# Patient Record
Sex: FEMALE | Race: WHITE | NOT HISPANIC OR LATINO | Employment: OTHER | URBAN - METROPOLITAN AREA
[De-identification: names, ages, dates, MRNs, and addresses within clinical notes are randomized per-mention and may not be internally consistent; named-entity substitution may affect disease eponyms.]

---

## 2017-01-23 ENCOUNTER — OFFICE VISIT (OUTPATIENT)
Dept: GASTROENTEROLOGY | Facility: CLINIC | Age: 77
End: 2017-01-23
Payer: MEDICARE

## 2017-01-23 ENCOUNTER — TELEPHONE (OUTPATIENT)
Dept: ENDOSCOPY | Facility: HOSPITAL | Age: 77
End: 2017-01-23

## 2017-01-23 VITALS
HEART RATE: 70 BPM | BODY MASS INDEX: 23.39 KG/M2 | SYSTOLIC BLOOD PRESSURE: 108 MMHG | WEIGHT: 163.38 LBS | HEIGHT: 70 IN | DIASTOLIC BLOOD PRESSURE: 62 MMHG

## 2017-01-23 DIAGNOSIS — Z12.11 SPECIAL SCREENING FOR MALIGNANT NEOPLASMS, COLON: Primary | ICD-10-CM

## 2017-01-23 DIAGNOSIS — K51.90 CHRONIC ULCERATIVE COLITIS, WITHOUT COMPLICATIONS: Primary | ICD-10-CM

## 2017-01-23 PROCEDURE — 99999 PR PBB SHADOW E&M-EST. PATIENT-LVL III: CPT | Mod: PBBFAC,GC,, | Performed by: INTERNAL MEDICINE

## 2017-01-23 PROCEDURE — 99213 OFFICE O/P EST LOW 20 MIN: CPT | Mod: PBBFAC | Performed by: INTERNAL MEDICINE

## 2017-01-23 PROCEDURE — 99214 OFFICE O/P EST MOD 30 MIN: CPT | Mod: S$PBB,GC,, | Performed by: INTERNAL MEDICINE

## 2017-01-23 RX ORDER — MESALAMINE 4 G/60ML
4 SUSPENSION RECTAL NIGHTLY
Qty: 1800 ML | Refills: 11 | Status: SHIPPED | OUTPATIENT
Start: 2017-01-23 | End: 2017-07-24

## 2017-01-23 RX ORDER — SODIUM, POTASSIUM,MAG SULFATES 17.5-3.13G
1 SOLUTION, RECONSTITUTED, ORAL ORAL AS DIRECTED
Qty: 1 BOTTLE | Refills: 0 | Status: SHIPPED | OUTPATIENT
Start: 2017-01-23 | End: 2017-07-24

## 2017-01-23 NOTE — PROGRESS NOTES
Gastroenterology Clinic    Reason for visit: The encounter diagnosis was Chronic ulcerative colitis, without complications.  Referring provider/PCP: LUTHER Perez    HPI:  Lena Felipe is a 76 y.o. lady with PMHx of HTN and UC (pan colitis, diagnosed in late 2013) who presents for follow-up. She was diagnosed with a colonoscopy in Jan 2014 when she was having bloody diarrhea. She has been maintained in remission with Balsalazide for the past two years. She has never been on any biologic therapy.      She is here with her  today. She reports she has been noticing blood in her stools for the past 6 months. She reports 2-3 formed stools daily with blood mixed with them. She was started on Colocort enemas in Jun and her bleeding stopped after a month of use. Her symptoms recurred in Sep 2016 and now bleeding continues despite 3 month of nightly colocot enema use. She has no night time symptoms, urgency or incomplete evacuation. She reports mild LLQ pain. She denies any extra-intestinal manifestations.     She does not smoke or drink.    PEndoHx:  Colon in Jan 2014 showed pan-colitis  Colon in Jan 2016 showed no colitis with no active disease on path.    Review of Systems:  Constitutional: no fever, chills or change in weight   Eyes: no visual changes   ENT: no sore throat or dysphagia  Respiratory: no cough or shortness of breath   Cardiovascular: no chest pain or palpitations   Gastrointestinal: as per HPI  Hematologic/Lymphatic: no easy bruising or lymphadenopathy   Musculoskeletal: no arthralgias or myalgias   Neurological: no change in mental status  Behavioral/Psych: no change in mood      Past Medical History   Diagnosis Date    Hypertension     Osteoporosis     Ulcerative colitis        Past Surgical History   Procedure Laterality Date    Hysterectomy      Tonsillectomy, adenoidectomy      Appendectomy      Toe surgery       left 2nd to last toe    Colonoscopy N/A 1/12/2016      Procedure: COLONOSCOPY;  Surgeon: Hong Rocha MD;  Location: [address]);  Service: Endoscopy;  Laterality: N/A;       Family History   Problem Relation Age of Onset    Celiac disease Neg Hx     Cirrhosis Neg Hx     Colon cancer Neg Hx     Colon polyps Neg Hx     Crohn's disease Neg Hx     Cystic fibrosis Neg Hx     Esophageal cancer Neg Hx     Hemochromatosis Neg Hx     Inflammatory bowel disease Neg Hx     Irritable bowel syndrome Neg Hx     Liver cancer Neg Hx     Liver disease Neg Hx     Rectal cancer Neg Hx     Stomach cancer Neg Hx     Ulcerative colitis Neg Hx     Ayaan's disease Neg Hx        Social History     Social History    Marital status:      Spouse name: N/A    Number of children: N/A    Years of education: N/A     Occupational History    Not on file.     Social History Main Topics    Smoking status: Never Smoker    Smokeless tobacco: Never Used    Alcohol use No    Drug use: Not on file    Sexual activity: Not on file     Other Topics Concern    Not on file     Social History Narrative       Current Outpatient Prescriptions on File Prior to Visit   Medication Sig Dispense Refill    alendronate (FOSAMAX) 70 MG tablet Take 70 mg by mouth every 7 days.      amlodipine-benazepril 5-20 mg (LOTREL) 5-20 mg per capsule Take 1 capsule by mouth once daily.      atenolol (TENORMIN) 50 MG tablet Take 50 mg by mouth once daily.      balsalazide (COLAZAL) 750 mg capsule Take 3 capsules (2,250 mg total) by mouth 3 (three) times daily. 720 capsule 3    calcium-vitamin D (OSCAL) 250 (625)-125 mg-unit per tablet Take 2 tablets by mouth once daily.      hydrochlorothiazide (MICROZIDE) 12.5 mg capsule Take 12.5 mg by mouth once daily.      MULTIVIT-IRON-MIN-FOLIC ACID 3,500-18-0.4 UNIT-MG-MG ORAL CHEW Take 1 tablet by mouth once daily.      pravastatin (PRAVACHOL) 10 MG tablet Take 10 mg by mouth once daily.      ZINC ORAL Take by mouth once daily.       "[DISCONTINUED] hydrocortisone-pramoxine (PROCTOFOAM-HS) rectal foam Place 1 applicator rectally 2 (two) times daily. 10 g 3    [DISCONTINUED] mesalamine (CANASA) 1000 MG Supp Place 1 suppository (1,000 mg total) rectally nightly. 30 suppository 11    [DISCONTINUED] mesalamine (CANASA) 1000 MG Supp Place 1 suppository (1,000 mg total) rectally nightly. 30 suppository 11     No current facility-administered medications on file prior to visit.        Review of patient's allergies indicates:  No Known Allergies    Physical Exam:  Visit Vitals    /62    Pulse 70    Ht 5' 10" (1.778 m)    Wt 74.1 kg (163 lb 5.8 oz)    BMI 23.44 kg/m2     General appearance: alert, appears stated age and cooperative  Neck: no JVD and supple, symmetrical, trachea midline  Lungs: clear to auscultation bilaterally  Chest wall: no tenderness  Heart: regular rate and rhythm, S1, S2 normal, no murmur, click, rub or gallop  Abdomen: soft, mild tenderness to deep palpation in LLQ; bowel sounds normal; no masses,  no organomegaly  Rectal: No large external hemorrhoids  Extremities: extremities normal, atraumatic, no cyanosis or edema  Pulses: 2+ and symmetric  Neurologic: Grossly normal    Laboratory:  Lab Results   Component Value Date    WBC 5.90 04/25/2016    HGB 12.9 04/25/2016    HCT 38.7 04/25/2016    MCV 87 04/25/2016     04/25/2016     CMP  Sodium   Date Value Ref Range Status   04/25/2016 141 136 - 145 mmol/L Final     Potassium   Date Value Ref Range Status   04/25/2016 3.8 3.5 - 5.1 mmol/L Final     Chloride   Date Value Ref Range Status   04/25/2016 104 95 - 110 mmol/L Final     CO2   Date Value Ref Range Status   04/25/2016 28 23 - 29 mmol/L Final     Glucose   Date Value Ref Range Status   04/25/2016 91 70 - 110 mg/dL Final     BUN, Bld   Date Value Ref Range Status   04/25/2016 16 8 - 23 mg/dL Final     Creatinine   Date Value Ref Range Status   04/25/2016 0.8 0.5 - 1.4 mg/dL Final     Calcium   Date Value Ref " Range Status   04/25/2016 9.7 8.7 - 10.5 mg/dL Final     Total Protein   Date Value Ref Range Status   04/25/2016 6.9 6.0 - 8.4 g/dL Final     Albumin   Date Value Ref Range Status   04/25/2016 3.8 3.5 - 5.2 g/dL Final     Total Bilirubin   Date Value Ref Range Status   04/25/2016 0.7 0.1 - 1.0 mg/dL Final     Comment:     For infants and newborns, interpretation of results should be based  on gestational age, weight and in agreement with clinical  observations.  Premature Infant recommended reference ranges:  Up to 24 hours.............<8.0 mg/dL  Up to 48 hours............<12.0 mg/dL  3-5 days..................<15.0 mg/dL  6-29 days.................<15.0 mg/dL       Alkaline Phosphatase   Date Value Ref Range Status   04/25/2016 76 55 - 135 U/L Final     AST   Date Value Ref Range Status   04/25/2016 30 10 - 40 U/L Final     ALT   Date Value Ref Range Status   04/25/2016 15 10 - 44 U/L Final     Anion Gap   Date Value Ref Range Status   04/25/2016 9 8 - 16 mmol/L Final     eGFR if    Date Value Ref Range Status   04/25/2016 >60.0 >60 mL/min/1.73 m^2 Final     eGFR if non    Date Value Ref Range Status   04/25/2016 >60.0 >60 mL/min/1.73 m^2 Final     Comment:     Calculation used to obtain the estimated glomerular filtration  rate (eGFR) is the CKD-EPI equation. Since race is unknown   in our information system, the eGFR values for   -American and Non--American patients are given   for each creatinine result.         Imaging:  Reviewed    Assessment:  Lena Felipe is a 76 y.o. lady with PMHx of HTN and UC (pan colitis, diagnosed in late 2013) who presents for follow-up. She appears to have clinical recurrence of her disease as manifested by blood per rectum and LLQ pain. Hydrocortisone enema's have failed to stop the bleeding. This is concerning for worsening disease activity.    Plan:  1. Stop hydrocortisone enemas and switch to Rowasa enema QHS  2. Will schedule  colonoscopy if Rowasa fails to resolve symptoms in 1 month  3. Continue Balsalazide  4. Check CBC, CMP and CRP today    Estrada Wilcox  Gastroenterology Fellow (PGY VI)  Pager: [phone]    Orders Placed This Encounter   Procedures    CBC auto differential    C-reactive protein    Comprehensive metabolic panel

## 2017-01-23 NOTE — MR AVS SNAPSHOT
[facility]  [address]  [place] [address]  Phone: [phone]  Fax: [phone]                  Lena Felipe   2017 2:00 PM   Office Visit    Description:  Female : 1940   Provider:  Estrada Wilcox MD   Department:  [facility]           Reason for Visit     Ulcerative Colitis           Diagnoses this Visit        Comments    Chronic ulcerative colitis, without complications    -  Primary            To Do List           Future Appointments        Provider Department Dept Phone    2017 3:00 PM LAB, APPOINTMENT [place] [facility] [phone]      Goals (5 Years of Data)     None       These Medications        Disp Refills Start End    mesalamine (ROWASA) 4 gram/60 mL Enem 1800 mL 11 2017    Place 60 mLs (4 g total) rectally every evening. - Rectal    Pharmacy: [address] #: [phone]         [facility] On Call     [facility] On [facility] Nurse Care Line -  Assistance  Registered nurses in the [facility] Center provide clinical advisement, health education, appointment booking, and other advisory services.  Call for this free service at [phone].             Medications           Message regarding Medications     Verify the changes and/or additions to your medication regime listed below are the same as discussed with your clinician today.  If any of these changes or additions are incorrect, please notify your healthcare provider.        START taking these NEW medications        Refills    mesalamine (ROWASA) 4 gram/60 mL Enem 11    Sig: Place 60 mLs (4 g total) rectally every evening.    Class: Normal    Route: Rectal      STOP taking these medications     hydrocortisone-pramoxine (PROCTOFOAM-HS) rectal foam Place 1 applicator rectally 2 (two) times daily.    mesalamine (CANASA) 1000 MG Supp Place 1 suppository (1,000 mg total) rectally nightly.  "   mesalamine (CANASA) 1000 MG Supp Place 1 suppository (1,000 mg total) rectally nightly.           Verify that the below list of medications is an accurate representation of the medications you are currently taking.  If none reported, the list may be blank. If incorrect, please contact your healthcare provider. Carry this list with you in case of emergency.           Current Medications     alendronate (FOSAMAX) 70 MG tablet Take 70 mg by mouth every 7 days.    amlodipine-benazepril 5-20 mg (LOTREL) 5-20 mg per capsule Take 1 capsule by mouth once daily.    atenolol (TENORMIN) 50 MG tablet Take 50 mg by mouth once daily.    balsalazide (COLAZAL) 750 mg capsule Take 3 capsules (2,250 mg total) by mouth 3 (three) times daily.    calcium-vitamin D (OSCAL) 250 (625)-125 mg-unit per tablet Take 2 tablets by mouth once daily.    hydrochlorothiazide (MICROZIDE) 12.5 mg capsule Take 12.5 mg by mouth once daily.    HYDROCORTISONE (COLOCORT RECT) Place rectally.    MULTIVIT-IRON-MIN-FOLIC ACID 3,500-18-0.4 UNIT-MG-MG ORAL CHEW Take 1 tablet by mouth once daily.    pravastatin (PRAVACHOL) 10 MG tablet Take 10 mg by mouth once daily.    mesalamine (ROWASA) 4 gram/60 mL Enem Place 60 mLs (4 g total) rectally every evening.    ZINC ORAL Take by mouth once daily.           Clinical Reference Information           Vital Signs - Last Recorded  Most recent update: 1/23/2017  1:35 PM by Justina Wynne MA    BP Pulse Ht Wt BMI    108/62 70 5' 10" (1.778 m) 74.1 kg (163 lb 5.8 oz) 23.44 kg/m2      Blood Pressure          Most Recent Value    BP  108/62      Allergies as of 1/23/2017     No Known Allergies      Immunizations Administered on Date of Encounter - 1/23/2017     None      Orders Placed During Today's Visit      Normal Orders This Visit    Case request GI: COLONOSCOPY     Future Labs/Procedures Expected by Expires    C-reactive protein  1/23/2017 3/24/2018    CBC auto differential  1/23/2017 3/24/2018    Comprehensive " metabolic panel  1/23/2017 3/24/2018

## 2017-02-10 ENCOUNTER — TELEPHONE (OUTPATIENT)
Dept: GASTROENTEROLOGY | Facility: CLINIC | Age: 77
End: 2017-02-10

## 2017-02-10 NOTE — TELEPHONE ENCOUNTER
"----- Message from Hong Rocha MD sent at 2/10/2017 12:22 PM CST -----  Contact: self  [phone]  She could cut back to every other night for two weeks  ----- Message -----     From: Justina Wynne MA     Sent: 2/9/2017   4:21 PM       To: Hong Rocha MD    Doing much better. Medication, Rowasa enemas, are working.  Not seeing any blood. Not quite secure "in that area" but better.She has 2 more weeks of the enemas.  She would like to know if she continues every night or starts cutting back?  ----- Message -----     From: Divina Barth MA     Sent: 2/9/2017   4:04 PM       To: Aj Steinberg    States she is calling to report to you how she is doing on the new medication and to find out what she needs to do next.      "

## 2017-03-16 ENCOUNTER — TELEPHONE (OUTPATIENT)
Dept: GASTROENTEROLOGY | Facility: CLINIC | Age: 77
End: 2017-03-16

## 2017-03-16 ENCOUNTER — TELEPHONE (OUTPATIENT)
Dept: ENDOSCOPY | Facility: HOSPITAL | Age: 77
End: 2017-03-16

## 2017-03-16 NOTE — TELEPHONE ENCOUNTER
----- Message from Hong Rocha MD sent at 3/16/2017  8:53 AM CDT -----  Contact: Self- [phone]  That sounds like a good response  Let's stop the enemas now  ----- Message -----     From: Justina Wynne MA     Sent: 3/16/2017   8:46 AM       To: Hong Rocha MD     Has been using the mesalamine enemas every other nite since January.  Does not see any visible blood. Her bathroom habits are normal or as normal as they can be for her.  She kyle like to know how much longer do you want her on the enemas?  ----- Message -----     From: Alcira Berkowitz     Sent: 3/16/2017   8:23 AM       To: Aj Rocha- pt is returning a call to Sosa to let her know how her medication is working- mesalamine (ROWASA) 4 gram/60 mL Enem- says there is no bleeding- please call pt back at [phone]

## 2017-03-16 NOTE — TELEPHONE ENCOUNTER
Returned call. Patient asked to cancel order for an appointment for Colonoscopy scheduled on 4/4/2017 at 10 am. She states her condition is improved and was told by Dr. Wilcox/Dr. Rocha no need to have the procedure. Order canceled. Instructed the patient to call Dr. Rocha if her condition changes.

## 2017-07-24 ENCOUNTER — OFFICE VISIT (OUTPATIENT)
Dept: GASTROENTEROLOGY | Facility: CLINIC | Age: 77
End: 2017-07-24
Payer: MEDICARE

## 2017-07-24 VITALS
HEIGHT: 70 IN | DIASTOLIC BLOOD PRESSURE: 75 MMHG | HEART RATE: 63 BPM | SYSTOLIC BLOOD PRESSURE: 147 MMHG | BODY MASS INDEX: 23.04 KG/M2 | WEIGHT: 160.94 LBS

## 2017-07-24 DIAGNOSIS — K21.9 GASTROESOPHAGEAL REFLUX DISEASE WITHOUT ESOPHAGITIS: ICD-10-CM

## 2017-07-24 DIAGNOSIS — K51.90 ULCERATIVE COLITIS, CHRONIC, WITHOUT COMPLICATIONS: Primary | ICD-10-CM

## 2017-07-24 PROCEDURE — 1159F MED LIST DOCD IN RCRD: CPT | Mod: ,,, | Performed by: INTERNAL MEDICINE

## 2017-07-24 PROCEDURE — 99999 PR PBB SHADOW E&M-EST. PATIENT-LVL III: CPT | Mod: PBBFAC,,, | Performed by: INTERNAL MEDICINE

## 2017-07-24 PROCEDURE — 1126F AMNT PAIN NOTED NONE PRSNT: CPT | Mod: ,,, | Performed by: INTERNAL MEDICINE

## 2017-07-24 PROCEDURE — 99213 OFFICE O/P EST LOW 20 MIN: CPT | Mod: PBBFAC | Performed by: INTERNAL MEDICINE

## 2017-07-24 PROCEDURE — 99214 OFFICE O/P EST MOD 30 MIN: CPT | Mod: S$PBB,,, | Performed by: INTERNAL MEDICINE

## 2017-07-24 RX ORDER — GABAPENTIN 300 MG/1
300 CAPSULE ORAL DAILY
COMMUNITY

## 2017-07-24 NOTE — PROGRESS NOTES
Subjective:       Patient ID: Lena Felipe is a 77 y.o. female.    Chief Complaint: Ulcerative Colitis    HPI  Review of Systems   Constitutional: Positive for fatigue. Negative for activity change, appetite change, chills, diaphoresis, fever and unexpected weight change.   HENT: Negative for congestion, ear pain, mouth sores, nosebleeds, postnasal drip, rhinorrhea, sinus pressure, sore throat, trouble swallowing and voice change.    Eyes: Negative for pain.   Respiratory: Negative for cough, shortness of breath and wheezing.    Cardiovascular: Negative for chest pain, palpitations and leg swelling.   Genitourinary: Negative for difficulty urinating, dysuria, flank pain, hematuria and menstrual problem.   Musculoskeletal: Positive for arthralgias. Negative for back pain, gait problem, joint swelling, myalgias and neck pain.   Skin: Negative for rash.   Neurological: Positive for headaches. Negative for dizziness, tremors, syncope and numbness.   Hematological: Negative for adenopathy. Does not bruise/bleed easily.   Psychiatric/Behavioral: Negative for agitation, behavioral problems, confusion, decreased concentration and dysphoric mood. The patient is not nervous/anxious.        Objective:      Physical Exam   Abdominal: Soft. Normal appearance and bowel sounds are normal. She exhibits no distension, no fluid wave and no mass. There is no hepatosplenomegaly. There is no tenderness.       Assessment:       1. Ulcerative colitis, chronic, without complications    2. Gastroesophageal reflux disease without esophagitis        Plan:         This is a followup visit for Ms. Felipe.  She is here with her , they   drove down from [place] today.  She is a lady who has had ulcerative   colitis, which is pancolitis since 2013.  Last colonoscopy 2016 showed the   disease to be in remission on balsalazide.  Although, earlier this year   symptomatically, she was having a flare of disease.  We tried treating that  with   hydrocortisone enemas and that did not seem to help very much, but a change   over to Rowasa enemas promptly brought her symptoms under control.    I reviewed her current symptoms.  She is doing very well right now.  Her stools   are formed.  She typically has 2-3 bowel movements per day.  There is no   diarrhea.  No blood.  No tenesmus.  She has some mild urgency in the morning.    There is no abdominal pain or abdominal cramps.  There is no nausea or vomiting.    No perianal symptoms.  Her reflux symptoms are better, they are well   controlled on Zantac alone.    REVIEW OF SYSTEMS:  Significant only for a recent episode of shingles for which   she was put on gabapentin.    ASSESSMENT AND DECISION MAKIN. Ulcerative colitis.  This is pancolitis of four years duration.  I talked   about potential complications.  She has had a flare that we brought under   control with Rowasa enemas, any increase in symptoms we will try that again.  I   recommended a colonoscopy every two years for colon cancer surveillance because   of her onset at an advanced age.  Recommend colonoscopy in January.  We   discussed compliance with treatment and the importance of staying on   balsalazide.  She asked if she could ever get off it and there may come a time   if she has an established remission for a long time we will discuss stopping it,   but I think now it would be appropriate to continue the medicine.  2. Reflux.  She is well controlled on Zantac.  No reason to change that.    Encouraged good habits.      DEA  dd: 2017 13:54:02 (CDT)  td: 2017 04:15:43 (CDT)  Doc ID   #[id number]  Job ID #[id number]    CC:

## 2017-08-31 DIAGNOSIS — K51.90 ULCERATIVE COLITIS, CHRONIC, WITHOUT COMPLICATIONS: ICD-10-CM

## 2017-08-31 RX ORDER — BALSALAZIDE DISODIUM 750 MG/1
CAPSULE ORAL
Qty: 720 CAPSULE | Refills: 3 | Status: SHIPPED | OUTPATIENT
Start: 2017-08-31 | End: 2017-08-31 | Stop reason: SDUPTHER

## 2017-08-31 RX ORDER — BALSALAZIDE DISODIUM 750 MG/1
2250 CAPSULE ORAL 3 TIMES DAILY
Qty: 720 CAPSULE | Refills: 11 | Status: SHIPPED | OUTPATIENT
Start: 2017-08-31 | End: 2019-01-21 | Stop reason: SDUPTHER

## 2017-10-17 ENCOUNTER — TELEPHONE (OUTPATIENT)
Dept: GASTROENTEROLOGY | Facility: CLINIC | Age: 77
End: 2017-10-17

## 2017-10-17 DIAGNOSIS — K51.019 CHRONIC ULCERATIVE PANCOLITIS WITH COMPLICATION: Primary | ICD-10-CM

## 2017-10-17 NOTE — TELEPHONE ENCOUNTER
Stated about a week ago she started noticing a little bit of blood, mainly in the afternoon. Last night she had a feeling she had to go to the bathroom but it was gas with a little blood.  Prior to this happening she stated she had a feeling of rawness.  She does have mesalamine enemas.  She wanted to know do you want her to start those?  She has a full Rx.

## 2017-10-17 NOTE — TELEPHONE ENCOUNTER
----- Message from Zahra Farrell sent at 10/17/2017  9:32 AM CDT -----  Contact: Self  Pt is calling to speak with Ms. Morales regarding her medical condition that has reoccurred.  Pt says she needs instructions.    She can be reached at [phone].    Thank you.

## 2017-10-20 ENCOUNTER — TELEPHONE (OUTPATIENT)
Dept: GASTROENTEROLOGY | Facility: CLINIC | Age: 77
End: 2017-10-20

## 2017-10-20 NOTE — TELEPHONE ENCOUNTER
----- Message from Daphnei Guerra sent at 10/20/2017 10:02 AM CDT -----  Contact: self - [phone]  [facility] is calling for instructions - please call patient at

## 2017-10-20 NOTE — TELEPHONE ENCOUNTER
Spoke to pt. Pt still having some bleeding. She will start her mesalamine enemas and wait for your call on Monday.

## 2017-10-24 ENCOUNTER — TELEPHONE (OUTPATIENT)
Dept: GASTROENTEROLOGY | Facility: CLINIC | Age: 77
End: 2017-10-24

## 2017-10-24 NOTE — TELEPHONE ENCOUNTER
----- Message from Daphnie Guerra sent at 10/20/2017 10:02 AM CDT -----  Contact: self - [phone]  [facility] is calling for instructions - please call patient at

## 2017-10-24 NOTE — TELEPHONE ENCOUNTER
Spoke with patient.  Aware Dr.James Rocha would like for her to take the enemas for 2 weeks and get back with him as to how she is feeling.  Patient expressed understanding.

## 2017-11-03 ENCOUNTER — TELEPHONE (OUTPATIENT)
Dept: GASTROENTEROLOGY | Facility: CLINIC | Age: 77
End: 2017-11-03

## 2017-11-03 NOTE — TELEPHONE ENCOUNTER
----- Message from Daphnie Guerra sent at 11/3/2017  9:54 AM CDT -----  Contact: self - [phone]  Aj - ulcerative proctitis - wants to discuss her condition - please call patient at

## 2017-11-06 DIAGNOSIS — K64.9 HEMORRHOIDS, UNSPECIFIED HEMORRHOID TYPE: Primary | ICD-10-CM

## 2017-11-06 RX ORDER — LIDOCAINE HYDROCHLORIDE AND HYDROCORTISONE ACETATE 30; 5 MG/G; MG/G
1 CREAM RECTAL 2 TIMES DAILY
Qty: 7 G | Refills: 1 | Status: SHIPPED | OUTPATIENT
Start: 2017-11-06 | End: 2021-11-04

## 2017-11-07 ENCOUNTER — TELEPHONE (OUTPATIENT)
Dept: GASTROENTEROLOGY | Facility: CLINIC | Age: 77
End: 2017-11-07

## 2017-11-07 NOTE — TELEPHONE ENCOUNTER
----- Message from Ramonita Hemphill MA sent at 11/7/2017  3:31 PM CST -----  Contact: [phone]  Aj  Returning a call to Sosa, checking on her condition and medicationl request     [phone]

## 2017-11-07 NOTE — TELEPHONE ENCOUNTER
Spoke with patient regarding a Rx sent to her pharmacy.  Patient stated she went to pick it up and was told a PA was needed.

## 2017-11-08 ENCOUNTER — TELEPHONE (OUTPATIENT)
Dept: GASTROENTEROLOGY | Facility: CLINIC | Age: 77
End: 2017-11-08

## 2017-11-08 NOTE — TELEPHONE ENCOUNTER
Patient aware her insurance company, [employer], denied the Lidocaine HCL-hydrocortisone  AC cream 3-0.5% due to the fact medicare states the FDA has not approved it to be safe and effective.  Aware our office spoke with the pharmacist at [facility] and recommended an equivalent that is otc called Nepercainal-cream(contains dibucaine), apply as directed on the packaging.   feels this will be as effective.  Patient expressed understanding.

## 2017-11-18 ENCOUNTER — TELEPHONE (OUTPATIENT)
Dept: ENDOSCOPY | Facility: HOSPITAL | Age: 77
End: 2017-11-18

## 2017-11-18 DIAGNOSIS — Z12.11 SPECIAL SCREENING FOR MALIGNANT NEOPLASMS, COLON: Primary | ICD-10-CM

## 2017-11-18 RX ORDER — SODIUM, POTASSIUM,MAG SULFATES 17.5-3.13G
1 SOLUTION, RECONSTITUTED, ORAL ORAL ONCE
Qty: 1 BOTTLE | Refills: 0 | Status: SHIPPED | OUTPATIENT
Start: 2017-11-18 | End: 2017-11-18

## 2018-01-12 ENCOUNTER — TELEPHONE (OUTPATIENT)
Dept: GASTROENTEROLOGY | Facility: CLINIC | Age: 78
End: 2018-01-12

## 2018-01-12 NOTE — TELEPHONE ENCOUNTER
----- Message from Divina Barth MA sent at 1/12/2018  8:56 AM CST -----  Contact: self  [phone]  States she is calling regarding the appointment on Tuesday, 1-16-18.  States she is calling to find out if she can post-pond the appointment due to her son who had brain surgery.

## 2018-02-01 ENCOUNTER — ANESTHESIA (OUTPATIENT)
Dept: ENDOSCOPY | Facility: HOSPITAL | Age: 78
End: 2018-02-01
Payer: MEDICARE

## 2018-02-01 ENCOUNTER — SURGERY (OUTPATIENT)
Age: 78
End: 2018-02-01

## 2018-02-01 ENCOUNTER — HOSPITAL ENCOUNTER (OUTPATIENT)
Facility: HOSPITAL | Age: 78
Discharge: HOME | End: 2018-02-01
Attending: INTERNAL MEDICINE | Admitting: INTERNAL MEDICINE
Payer: MEDICARE

## 2018-02-01 ENCOUNTER — ANESTHESIA EVENT (OUTPATIENT)
Dept: ENDOSCOPY | Facility: HOSPITAL | Age: 78
End: 2018-02-01
Payer: MEDICARE

## 2018-02-01 VITALS
RESPIRATION RATE: 18 BRPM | HEIGHT: 70 IN | WEIGHT: 160 LBS | SYSTOLIC BLOOD PRESSURE: 140 MMHG | TEMPERATURE: 98 F | BODY MASS INDEX: 22.9 KG/M2 | OXYGEN SATURATION: 98 % | DIASTOLIC BLOOD PRESSURE: 65 MMHG | HEART RATE: 78 BPM

## 2018-02-01 DIAGNOSIS — K51.00 ULCERATIVE CHRONIC PANCOLITIS WITHOUT COMPLICATIONS: Primary | ICD-10-CM

## 2018-02-01 PROCEDURE — 63600175 PHARM REV CODE 636 W HCPCS: Performed by: NURSE ANESTHETIST, CERTIFIED REGISTERED

## 2018-02-01 PROCEDURE — 27201012 HC FORCEPS, HOT/COLD, DISP: Performed by: INTERNAL MEDICINE

## 2018-02-01 PROCEDURE — 45380 COLONOSCOPY AND BIOPSY: CPT | Performed by: INTERNAL MEDICINE

## 2018-02-01 PROCEDURE — 45380 COLONOSCOPY AND BIOPSY: CPT | Mod: PT,,, | Performed by: INTERNAL MEDICINE

## 2018-02-01 PROCEDURE — 37000008 HC ANESTHESIA 1ST 15 MINUTES: Performed by: INTERNAL MEDICINE

## 2018-02-01 PROCEDURE — 37000009 HC ANESTHESIA EA ADD 15 MINS: Performed by: INTERNAL MEDICINE

## 2018-02-01 PROCEDURE — D9220A PRA ANESTHESIA: Mod: PT,ANES,, | Performed by: ANESTHESIOLOGY

## 2018-02-01 PROCEDURE — 88342 IMHCHEM/IMCYTCHM 1ST ANTB: CPT | Mod: 26,,, | Performed by: PATHOLOGY

## 2018-02-01 PROCEDURE — 88305 TISSUE EXAM BY PATHOLOGIST: CPT | Mod: 26,,, | Performed by: PATHOLOGY

## 2018-02-01 PROCEDURE — 88342 IMHCHEM/IMCYTCHM 1ST ANTB: CPT | Performed by: PATHOLOGY

## 2018-02-01 PROCEDURE — 88305 TISSUE EXAM BY PATHOLOGIST: CPT | Performed by: PATHOLOGY

## 2018-02-01 PROCEDURE — 25000003 PHARM REV CODE 250: Performed by: INTERNAL MEDICINE

## 2018-02-01 PROCEDURE — D9220A PRA ANESTHESIA: Mod: PT,CRNA,, | Performed by: NURSE ANESTHETIST, CERTIFIED REGISTERED

## 2018-02-01 RX ORDER — PROPOFOL 10 MG/ML
VIAL (ML) INTRAVENOUS
Status: DISCONTINUED | OUTPATIENT
Start: 2018-02-01 | End: 2018-02-01

## 2018-02-01 RX ORDER — PROPOFOL 10 MG/ML
VIAL (ML) INTRAVENOUS CONTINUOUS PRN
Status: DISCONTINUED | OUTPATIENT
Start: 2018-02-01 | End: 2018-02-01

## 2018-02-01 RX ORDER — LIDOCAINE HCL/PF 100 MG/5ML
SYRINGE (ML) INTRAVENOUS
Status: DISCONTINUED | OUTPATIENT
Start: 2018-02-01 | End: 2018-02-01

## 2018-02-01 RX ORDER — SODIUM CHLORIDE 9 MG/ML
INJECTION, SOLUTION INTRAVENOUS CONTINUOUS
Status: DISCONTINUED | OUTPATIENT
Start: 2018-02-01 | End: 2018-02-01 | Stop reason: HOSPADM

## 2018-02-01 RX ADMIN — PROPOFOL 40 MG: 10 INJECTION, EMULSION INTRAVENOUS at 11:02

## 2018-02-01 RX ADMIN — LIDOCAINE HYDROCHLORIDE 100 MG: 20 INJECTION, SOLUTION INTRAVENOUS at 11:02

## 2018-02-01 RX ADMIN — SODIUM CHLORIDE: 0.9 INJECTION, SOLUTION INTRAVENOUS at 10:02

## 2018-02-01 RX ADMIN — PROPOFOL 150 MCG/KG/MIN: 10 INJECTION, EMULSION INTRAVENOUS at 11:02

## 2018-02-01 NOTE — TRANSFER OF CARE
"Anesthesia Transfer of Care Note    Patient: Lena Felipe    Procedure(s) Performed: Procedure(s) (LRB):  COLONOSCOPY (N/A)    Patient location: PACU    Anesthesia Type: general    Transport from OR: Transported from OR on room air with adequate spontaneous ventilation    Post pain: adequate analgesia    Post assessment: no apparent anesthetic complications    Post vital signs: stable    Level of consciousness: awake    Nausea/Vomiting: no nausea/vomiting    Complications: none    Transfer of care protocol was followed      Last vitals:   Visit Vitals  BP (!) 122/57 (Patient Position: Lying)   Pulse 66   Temp 36.7 °C (98 °F) (Temporal)   Resp 16   Ht 5' 10" (1.778 m)   Wt 72.6 kg (160 lb)   SpO2 96%   Breastfeeding? No   BMI 22.96 kg/m²     "

## 2018-02-01 NOTE — ANESTHESIA PREPROCEDURE EVALUATION
02/01/2018  Lena Felipe is a 77 y.o., female.    Pre-op Assessment         Review of Systems  Anesthesia Hx:  No problems with previous Anesthesia    Social:  Non-Smoker    Cardiovascular:   Exercise tolerance: good Hypertension, well controlled    Pulmonary:  Pulmonary Normal    Hepatic/GI:   Bowel Prep. PUD, Ulcerative colitis   Neurological:  Neurology Normal    Endocrine:  Endocrine Normal    Psych:  Psychiatric Normal           Physical Exam  General:  Well nourished    Airway/Jaw/Neck:  Airway Findings: Mouth Opening: Normal Tongue: Normal  General Airway Assessment: Adult  Mallampati: II  TM Distance: Normal, at least 6 cm  Jaw/Neck Findings:     Neck ROM: Normal ROM      Dental:  Dental Findings: In tact   Chest/Lungs:  Chest/Lungs Findings: Clear to auscultation, Normal Respiratory Rate     Heart/Vascular:  Heart Findings: Rate: Normal  Rhythm: Regular Rhythm  Sounds: Normal        Mental Status:  Mental Status Findings:  Alert and Oriented, Cooperative         Anesthesia Plan  Type of Anesthesia, risks & benefits discussed:  Anesthesia Type:  general  Patient's Preference:   Intra-op Monitoring Plan:   Intra-op Monitoring Plan Comments:   Post Op Pain Control Plan:   Post Op Pain Control Plan Comments:   Induction:   IV  Beta Blocker:  Patient is on a Beta-Blocker and has received one dose within the past 24 hours (No further documentation required).       Informed Consent: Patient understands risks and agrees with Anesthesia plan.  Questions answered. Anesthesia consent signed with patient.  ASA Score: 2     Day of Surgery Review of History & Physical:    H&P update referred to the provider.         Ready For Surgery From Anesthesia Perspective.

## 2018-02-01 NOTE — H&P
Short Stay Endoscopy History and Physical    PCP - LUTHER Perez    Procedure - Colonoscopy  ASA - per anesthesia  Mallampati - per anesthesia  History of Anesthesia problems - no  Family history Anesthesia problems - no   Plan of anesthesia - General    HPI:  This is a 77 y.o. female here for evaluation of : ulcerative colitis      ROS:  Constitutional: No fevers, chills, No weight loss  CV: No chest pain  Pulm: No cough, No shortness of breath  GI: see HPI  Derm: No rash    Medical History:  has a past medical history of Hypertension; Osteoporosis; and Ulcerative colitis.    Surgical History:  has a past surgical history that includes Hysterectomy; TONSILLECTOMY, ADENOIDECTOMY; Appendectomy; Toe Surgery; Colonoscopy (N/A, 1/12/2016); and lumpectomy (Right).    Family History: family history is not on file.. Otherwise no colon cancer, inflammatory bowel disease, or GI malignancies.    Social History:  reports that she has never smoked. She has never used smokeless tobacco. She reports that she does not drink alcohol.    Review of patient's allergies indicates:  No Known Allergies    Medications:   Prescriptions Prior to Admission   Medication Sig Dispense Refill Last Dose    alendronate (FOSAMAX) 70 MG tablet Take 70 mg by mouth every 7 days.   Past Week at Unknown time    amlodipine-benazepril 5-20 mg (LOTREL) 5-20 mg per capsule Take 1 capsule by mouth once daily.   2/1/2018 at Unknown time    atenolol (TENORMIN) 50 MG tablet Take 50 mg by mouth once daily.   2/1/2018 at Unknown time    balsalazide (COLAZAL) 750 mg capsule Take 3 capsules (2,250 mg total) by mouth 3 (three) times daily. 720 capsule 11 Past Week at Unknown time    calcium-vitamin D (OSCAL) 250 (625)-125 mg-unit per tablet Take 2 tablets by mouth once daily.   Past Week at Unknown time    gabapentin (NEURONTIN) 300 MG capsule Take 300 mg by mouth once daily.   Past Week at Unknown time    hydrochlorothiazide (MICROZIDE) 12.5 mg  capsule Take 12.5 mg by mouth once daily.   2/1/2018 at Unknown time    MULTIVIT-IRON-MIN-FOLIC ACID 3,500-18-0.4 UNIT-MG-MG ORAL CHEW Take 1 tablet by mouth once daily.   Past Week at Unknown time    pravastatin (PRAVACHOL) 10 MG tablet Take 10 mg by mouth once daily.   Past Week at Unknown time    ZINC ORAL Take by mouth once daily.   Past Week at Unknown time    lidocaine HCl-hydrocortison ac 3-0.5 % Crea Place 1 application rectally 2 (two) times daily. 7 g 1          Physical Exam:    Vital Signs:   Vitals:    02/01/18 1000   BP: (!) 122/57   Pulse: 66   Resp: 16   Temp: 98 °F (36.7 °C)       General Appearance: Well appearing in no acute distress  Eyes:    No scleral icterus  ENT: Neck supple, Lips, mucosa, and tongue normal; teeth and gums normal  Lungs: CTA bilaterally  Heart:  S1, S2 normal, no murmurs heard  Abdomen: Soft, non tender, non distended with positive bowel sounds. No hepatosplenomegaly, ascites, or mass.  Extremities: 2+ pulses, no clubbing, cyanosis or edema  Skin: No rash      Labs:  Lab Results   Component Value Date    WBC 4.95 07/24/2017    HGB 13.8 07/24/2017    HCT 41.1 07/24/2017     07/24/2017    ALT 15 07/24/2017    AST 30 07/24/2017     07/24/2017    K 4.0 07/24/2017     07/24/2017    CREATININE 0.8 07/24/2017    BUN 13 07/24/2017    CO2 31 (H) 07/24/2017       Sravan Fry   Gastroenterology Fellow PGY VI  [phone]      Sravan Fry MD

## 2018-02-01 NOTE — ANESTHESIA POSTPROCEDURE EVALUATION
"Anesthesia Post Evaluation    Patient: Lena Felipe    Procedure(s) Performed: Procedure(s) (LRB):  COLONOSCOPY (N/A)    Final Anesthesia Type: general  Patient location during evaluation: GI PACU  Patient participation: Yes- Able to Participate  Level of consciousness: awake and alert and oriented  Post-procedure vital signs: reviewed and stable  Pain management: adequate  Airway patency: patent  PONV status at discharge: No PONV  Anesthetic complications: no      Cardiovascular status: blood pressure returned to baseline  Respiratory status: unassisted, spontaneous ventilation and room air  Hydration status: euvolemic  Follow-up not needed.        Visit Vitals  BP (!) 140/65   Pulse 78   Temp 36.4 °C (97.5 °F) (Temporal)   Resp 18   Ht 5' 10" (1.778 m)   Wt 72.6 kg (160 lb)   SpO2 98%   Breastfeeding? No   BMI 22.96 kg/m²       Pain/Phylicia Score: Pain Assessment Performed: Yes (2/1/2018 11:31 AM)  Presence of Pain: denies (2/1/2018 11:31 AM)  Phylicia Score: 10 (2/1/2018 11:31 AM)      "

## 2018-02-01 NOTE — PROVATION PATIENT INSTRUCTIONS
Discharge Summary/Instructions after an Endoscopic Procedure  Patient Name: Lena Felipe  Patient MRN: [id number]  Patient YOB: 1940 Thursday, February 01, 2018  Hong Rocha MD  RESTRICTIONS:  During your procedure today, you received medications for sedation.  These   medications may affect your judgment, balance and coordination.  Therefore,   for 24 hours, you have the following restrictions:   - DO NOT drive a car, operate machinery, make legal/financial decisions,   sign important papers or drink alcohol.    ACTIVITY:  The following day: return to full activity including work, except no heavy   lifting, straining or running for 3 days if polyps were removed.  DIET:  Eat and drink normally unless instructed otherwise.     TREATMENT FOR COMMON SIDE EFFECTS:  - Mild abdominal pain, belching, bloating or excessive gas: rest, eat   lightly and use a heating pad.  - Sore Throat: treat with throat lozenges and/or gargle with warm salt   water.  SYMPTOMS TO WATCH FOR AND REPORT TO YOUR PHYSICIAN:  1. Abdominal pain or bloating, other than gas cramps.  2. Chest pain.  3. Back pain.  4. Chills or fever occurring within 24 hours after the procedure.  5. Rectal bleeding, which would show as bright red, maroon, or black stools.   (A tablespoon of blood from the rectum is not serious, especially if   hemorrhoids are present.)  6. Vomiting.  7. Weakness or dizziness.  8. Because air was used during the procedure, expelling large amounts of air   from your rectum or belching is normal.  9. If a bowel prep was taken, you may not have a bowel movement for 1-3   days.  This is normal.  GO DIRECTLY TO THE NEAREST EMERGENCY ROOM IF YOU HAVE ANY OF THE FOLLOWING:      Difficulty breathing  Chills and/or fever over 101 F   Persistent vomiting and/or vomiting blood   Severe abdominal pain   Severe chest pain   Black, tarry stools   Bleeding- more than one tablespoon   Any other symptom or condition that you may feel needs  urgent attention  Your doctor recommends these additional instructions:  If any biopsies were taken, your doctor s clinic will contact you in 1 to 2   weeks with any results.  You have a contact number available for emergencies.  The signs and symptoms   of potential delayed complications were discussed with you.  You may return   to normal activities tomorrow.  Written discharge instructions were   provided to you.   You are being discharged to home.   Resume your previous diet.   Continue your present medications.   We are waiting for your pathology results.   Your physician has recommended a repeat colonoscopy in two years for   surveillance.   Return to your GI clinic as previously scheduled.  For questions, problems or results please call your physician - Hong Rocha MD at Work:  [phone].  [facility] [place], EMERGENCY ROOM PHONE NUMBER: [phone]  IF A COMPLICATION OR EMERGENCY SITUATION ARISES AND YOU ARE UNABLE TO REACH   YOUR PHYSICIAN - GO DIRECTLY TO THE EMERGENCY ROOM.  Hong Rocha MD  2/1/2018 11:24:32 AM  This report has been verified and signed electronically.

## 2018-02-06 ENCOUNTER — TELEPHONE (OUTPATIENT)
Dept: GASTROENTEROLOGY | Facility: CLINIC | Age: 78
End: 2018-02-06

## 2018-02-06 NOTE — TELEPHONE ENCOUNTER
----- Message from Hong Rocha MD sent at 2/6/2018  7:28 AM CST -----  Please call, biopsies were ok, they show the active inflammation in the lower colon as expected

## 2018-02-08 ENCOUNTER — TELEPHONE (OUTPATIENT)
Dept: ENDOSCOPY | Facility: HOSPITAL | Age: 78
End: 2018-02-08

## 2018-04-25 ENCOUNTER — TELEPHONE (OUTPATIENT)
Dept: GASTROENTEROLOGY | Facility: CLINIC | Age: 78
End: 2018-04-25

## 2018-04-25 NOTE — TELEPHONE ENCOUNTER
----- Message from Hong Rocha MD sent at 4/25/2018  2:57 PM CDT -----  Contact: self - [phone]  If she truly has diverticulitis, she will need the antibiotics  Ideally she should go to an urgent care facility that could do CT scan to confirm diagnosis  ----- Message -----  From: Justina Wynne MA  Sent: 4/25/2018   2:39 PM  To: Hong Rocha MD    Concerned about getting on antibiotics.  She stated she knows that is what will happen.  Wanted to know what would you suggest.  ----- Message -----  From: Hong Rocha MD  Sent: 4/25/2018   2:36 PM  To: Justina Wynne MA    She should get checked out in an urgent care unit  ----- Message -----  From: Justina Wynne MA  Sent: 4/25/2018   2:16 PM  To: Hong Rocha MD    Pain started Monday PM after eating. Temp was 101.1 and last night 102.9.  Did not take anything because she was afraid to do so.  Her pain is a 10 out of 10.  Drinking fluids.  Pain is in the same place as in the past when she has diverticulitis.  Stated there was a tragedy in her family that she is dealing with.  She was not sure if this was playing a factor in the problem.  ----- Message -----  From: Daphnie Charlie  Sent: 4/25/2018   1:48 PM  To: Aj Rocha - has inflamed diverticulitis - please call patient at

## 2019-01-21 DIAGNOSIS — K51.90 ULCERATIVE COLITIS, CHRONIC, WITHOUT COMPLICATIONS: ICD-10-CM

## 2019-01-21 RX ORDER — BALSALAZIDE DISODIUM 750 MG/1
CAPSULE ORAL
Qty: 840 CAPSULE | Refills: 10 | Status: SHIPPED | OUTPATIENT
Start: 2019-01-21 | End: 2020-03-20

## 2019-09-24 ENCOUNTER — TELEPHONE (OUTPATIENT)
Dept: GASTROENTEROLOGY | Facility: CLINIC | Age: 79
End: 2019-09-24

## 2019-09-24 RX ORDER — MESALAMINE 4 G/60ML
4 SUSPENSION RECTAL NIGHTLY
Qty: 1800 ML | Refills: 11 | Status: SHIPPED | OUTPATIENT
Start: 2019-09-24 | End: 2020-03-23 | Stop reason: SDUPTHER

## 2019-09-24 NOTE — TELEPHONE ENCOUNTER
----- Message from Hong Rocha MD sent at 9/24/2019  1:29 PM CDT -----  Contact: pt#[phone]  Called in  ----- Message -----  From: Justina Wynne MA  Sent: 9/24/2019   9:32 AM CDT  To: Hong Rocha MD    Spoke with patient.  She stated since you are the doctor it is your call whether she should take the rowasa.  Though she went on to say it is better to be safe than sorry.  Pharmacy is listed in [employer].  ----- Message -----  From: Hong Rocha MD  Sent: 9/24/2019   9:25 AM CDT  To: Justina Wynne MA    I am not alarmed,  I do want to see her again in the office   In the past, rowasa enemas helped her with flares of colitis  We can do that again now, or we can wait and see what happens  Treating the diverticulitis with antibiotics is the correct approach  ----- Message -----  From: Justina Wynne MA  Sent: 9/24/2019   8:45 AM CDT  To: Hong Rocha MD    Spoke with patient.  Last colon a year ago.  Last seen in the office 2 years ago.  Presently on Balsalazide 3-3 times a day.  Stated she has had several attacks of diverticulitis since last visit that has sent her to the ED.  She was given medication that helped.  Earlier this month she had another attack that lasted longer.  She noticed in her stool spots of blood and yesterday there was about a teaspoon full of dark blood.  This only happened once.  She has also noticed some blood in her urine around the sides.   ----- Message -----  From: Mary Yates  Sent: 9/24/2019   8:34 AM CDT  To: Aj Steinberg    Pt wants to speak with nurse about blood in urine. Please call

## 2019-11-27 ENCOUNTER — LAB VISIT (OUTPATIENT)
Dept: LAB | Facility: HOSPITAL | Age: 79
End: 2019-11-27
Attending: INTERNAL MEDICINE
Payer: MEDICARE

## 2019-11-27 ENCOUNTER — OFFICE VISIT (OUTPATIENT)
Dept: GASTROENTEROLOGY | Facility: CLINIC | Age: 79
End: 2019-11-27
Payer: MEDICARE

## 2019-11-27 VITALS
BODY MASS INDEX: 22.88 KG/M2 | HEART RATE: 64 BPM | SYSTOLIC BLOOD PRESSURE: 160 MMHG | HEIGHT: 70 IN | DIASTOLIC BLOOD PRESSURE: 81 MMHG | WEIGHT: 159.81 LBS

## 2019-11-27 DIAGNOSIS — K51.00 ULCERATIVE CHRONIC PANCOLITIS WITHOUT COMPLICATIONS: ICD-10-CM

## 2019-11-27 DIAGNOSIS — K51.00 ULCERATIVE CHRONIC PANCOLITIS WITHOUT COMPLICATIONS: Primary | ICD-10-CM

## 2019-11-27 LAB
ALBUMIN SERPL BCP-MCNC: 4.4 G/DL (ref 3.5–5.2)
ALP SERPL-CCNC: 76 U/L (ref 55–135)
ALT SERPL W/O P-5'-P-CCNC: 14 U/L (ref 10–44)
ANION GAP SERPL CALC-SCNC: 7 MMOL/L (ref 8–16)
AST SERPL-CCNC: 31 U/L (ref 10–40)
BASOPHILS # BLD AUTO: 0.04 K/UL (ref 0–0.2)
BASOPHILS NFR BLD: 0.7 % (ref 0–1.9)
BILIRUB SERPL-MCNC: 1.2 MG/DL (ref 0.1–1)
BUN SERPL-MCNC: 14 MG/DL (ref 8–23)
CALCIUM SERPL-MCNC: 9.9 MG/DL (ref 8.7–10.5)
CHLORIDE SERPL-SCNC: 103 MMOL/L (ref 95–110)
CO2 SERPL-SCNC: 32 MMOL/L (ref 23–29)
CREAT SERPL-MCNC: 0.8 MG/DL (ref 0.5–1.4)
CRP SERPL-MCNC: 4.1 MG/L (ref 0–8.2)
DIFFERENTIAL METHOD: NORMAL
EOSINOPHIL # BLD AUTO: 0.2 K/UL (ref 0–0.5)
EOSINOPHIL NFR BLD: 3.9 % (ref 0–8)
ERYTHROCYTE [DISTWIDTH] IN BLOOD BY AUTOMATED COUNT: 13.3 % (ref 11.5–14.5)
EST. GFR  (AFRICAN AMERICAN): >60 ML/MIN/1.73 M^2
EST. GFR  (NON AFRICAN AMERICAN): >60 ML/MIN/1.73 M^2
GLUCOSE SERPL-MCNC: 99 MG/DL (ref 70–110)
HCT VFR BLD AUTO: 42.5 % (ref 37–48.5)
HGB BLD-MCNC: 13.7 G/DL (ref 12–16)
IMM GRANULOCYTES # BLD AUTO: 0.02 K/UL (ref 0–0.04)
IMM GRANULOCYTES NFR BLD AUTO: 0.3 % (ref 0–0.5)
LYMPHOCYTES # BLD AUTO: 2 K/UL (ref 1–4.8)
LYMPHOCYTES NFR BLD: 34.6 % (ref 18–48)
MCH RBC QN AUTO: 29.5 PG (ref 27–31)
MCHC RBC AUTO-ENTMCNC: 32.2 G/DL (ref 32–36)
MCV RBC AUTO: 91 FL (ref 82–98)
MONOCYTES # BLD AUTO: 0.6 K/UL (ref 0.3–1)
MONOCYTES NFR BLD: 9.5 % (ref 4–15)
NEUTROPHILS # BLD AUTO: 3 K/UL (ref 1.8–7.7)
NEUTROPHILS NFR BLD: 51 % (ref 38–73)
NRBC BLD-RTO: 0 /100 WBC
PLATELET # BLD AUTO: 250 K/UL (ref 150–350)
PMV BLD AUTO: 10.8 FL (ref 9.2–12.9)
POTASSIUM SERPL-SCNC: 3.9 MMOL/L (ref 3.5–5.1)
PROT SERPL-MCNC: 7.8 G/DL (ref 6–8.4)
RBC # BLD AUTO: 4.65 M/UL (ref 4–5.4)
SODIUM SERPL-SCNC: 142 MMOL/L (ref 136–145)
WBC # BLD AUTO: 5.87 K/UL (ref 3.9–12.7)

## 2019-11-27 PROCEDURE — 99215 PR OFFICE/OUTPT VISIT, EST, LEVL V, 40-54 MIN: ICD-10-PCS | Mod: S$PBB,,, | Performed by: INTERNAL MEDICINE

## 2019-11-27 PROCEDURE — 99999 PR PBB SHADOW E&M-EST. PATIENT-LVL III: CPT | Mod: PBBFAC,,, | Performed by: INTERNAL MEDICINE

## 2019-11-27 PROCEDURE — 86140 C-REACTIVE PROTEIN: CPT

## 2019-11-27 PROCEDURE — 85025 COMPLETE CBC W/AUTO DIFF WBC: CPT

## 2019-11-27 PROCEDURE — 1159F PR MEDICATION LIST DOCUMENTED IN MEDICAL RECORD: ICD-10-PCS | Mod: ,,, | Performed by: INTERNAL MEDICINE

## 2019-11-27 PROCEDURE — 1159F MED LIST DOCD IN RCRD: CPT | Mod: ,,, | Performed by: INTERNAL MEDICINE

## 2019-11-27 PROCEDURE — 99999 PR PBB SHADOW E&M-EST. PATIENT-LVL III: ICD-10-PCS | Mod: PBBFAC,,, | Performed by: INTERNAL MEDICINE

## 2019-11-27 PROCEDURE — 80053 COMPREHEN METABOLIC PANEL: CPT

## 2019-11-27 PROCEDURE — 99213 OFFICE O/P EST LOW 20 MIN: CPT | Mod: PBBFAC | Performed by: INTERNAL MEDICINE

## 2019-11-27 PROCEDURE — 36415 COLL VENOUS BLD VENIPUNCTURE: CPT

## 2019-11-27 PROCEDURE — 99215 OFFICE O/P EST HI 40 MIN: CPT | Mod: S$PBB,,, | Performed by: INTERNAL MEDICINE

## 2019-11-27 RX ORDER — DICYCLOMINE HYDROCHLORIDE 10 MG/1
10 CAPSULE ORAL 2 TIMES DAILY PRN
Qty: 60 CAPSULE | Refills: 3 | Status: SHIPPED | OUTPATIENT
Start: 2019-11-27 | End: 2019-12-27

## 2019-11-27 NOTE — PROGRESS NOTES
Subjective:       Patient ID: Lena Felipe is a 79 y.o. female.    Chief Complaint: Ulcerative Colitis    HPI  Review of Systems   Constitutional: Positive for fatigue. Negative for activity change, appetite change, chills, diaphoresis, fever and unexpected weight change.   HENT: Negative for congestion, ear pain, mouth sores, nosebleeds, postnasal drip, rhinorrhea, sinus pressure, sore throat, trouble swallowing and voice change.    Eyes: Negative for pain.   Respiratory: Negative for cough, shortness of breath and wheezing.    Cardiovascular: Negative for chest pain, palpitations and leg swelling.   Genitourinary: Negative for difficulty urinating, dysuria, flank pain, hematuria and menstrual problem.   Musculoskeletal: Negative for arthralgias, back pain, gait problem, joint swelling, myalgias and neck pain.   Skin: Negative for rash.   Neurological: Negative for dizziness, tremors, syncope, numbness and headaches.   Hematological: Negative for adenopathy. Does not bruise/bleed easily.   Psychiatric/Behavioral: Negative for agitation, behavioral problems, confusion, decreased concentration and dysphoric mood. The patient is not nervous/anxious.        Objective:      Physical Exam   Constitutional: She is oriented to person, place, and time. She appears well-developed and well-nourished. No distress.   HENT:   Head: Normocephalic and atraumatic.   Right Ear: External ear normal.   Left Ear: External ear normal.   Nose: Nose normal.   Mouth/Throat: Oropharynx is clear and moist. No oropharyngeal exudate.   Eyes: Pupils are equal, round, and reactive to light. Conjunctivae are normal. No scleral icterus.   Neck: Normal range of motion. Neck supple. No thyromegaly present.   Cardiovascular: Normal rate, regular rhythm, normal heart sounds and intact distal pulses. Exam reveals no gallop.   No murmur heard.  Pulmonary/Chest: Effort normal and breath sounds normal. She has no wheezes. She has no rales.   Abdominal:  Soft. Bowel sounds are normal. She exhibits no distension and no mass. There is no tenderness. There is no guarding.   Musculoskeletal: Normal range of motion. She exhibits no edema or tenderness.   Lymphadenopathy:     She has no cervical adenopathy.   Neurological: She is alert and oriented to person, place, and time. No cranial nerve deficit.   Skin: Skin is warm and dry. No rash noted.   Psychiatric: She has a normal mood and affect. Her behavior is normal. Judgment and thought content normal.       Assessment:       1. 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 Felipe is a 79-year-old lady accompanied by her , driving down from [place] today.  She has longstanding ulcerative colitis.  This is been present since 2013.  Is pan colitis.  It was in remission in 2016.  However on her colonoscopy in 2018 we did demonstrate active distal disease. Her treatment has been balsalazide. It has been 2 years since I have seen her in the office.    She contacted my office several months ago as she was having a flare of symptoms.  She was having consistent blood with her bowel movements and increased urgency.  We continued the bowel cell is I but added Rowasa enemas.  She has now improved on that regimen.  She has occasional rectal bleeding but it is rare now.  She was having it consistently.  She typically now has 2 or 3 bowel movements a day.  The stool is soft and formed.  She has always had some degree of urgency.  It is not extreme now per she has occasional episodes of left lower quadrant pain. And she has occasional episodes of nausea as well. Overall she feels like the symptoms are more manageable.  They are not interfering with her life other than the inconvenience of the enema at night.    Review of systems is consistent only for some fatigue which she thinks may be related to her age.    Assessment  1.  Ulcerative colitis this is a difficult situation.  She probably  does have some mild active distal disease. I do not think it is completely in remission like it was before.  She has had a very stressful year with lots of events that have occurred.  She is managing overall quite well. And in general the combination balsalazide and topical therapy is giving her a satisfactory quality of life.  If we were going to be more aggressive about her disease we would want to do a follow-up colonoscopy and document the disease activity.  After that we would  be considering adding a different medicine which in her case would be a biologic or immunosuppressed seen strategy.  Both of these would have an increased risk of infectious complications at her age.  We discussed those complications and together we decided to not be more aggressive but just to continue our current medications strategy.  Will schedule a colonoscopy for next year but I will follow her up in the office in 6 months.  At that time we will revisit whether to do a colonoscopy, reassess her disease, and or changed her therapy.  One thing I could alternatively due in the spring would be a fecal calprotectin.  I did offer Bentyl to help with abdominal pain.    40 min appointment greater half time face-to-face counseling

## 2019-11-27 NOTE — LETTER
November 27, 2019      [place], [facility]  [address] [place] [address]           [facility]  [address]  [place] [address]  Phone: [phone]  Fax: [phone]          Patient: Lena Felipe   MR Number: [id number]   YOB: 1940   Date of Visit: 11/27/2019       Dear [facility]:    Thank you for referring Lena Felipe to me for evaluation. Attached you will find relevant portions of my assessment and plan of care.    If you have questions, please do not hesitate to call me. I look forward to following Lena Felipe along with you.    Sincerely,    Hong Rocha MD    Enclosure  CC:  No Recipients    If you would like to receive this communication electronically, please contact [email or web address] or [phone] to request more information on [employer] access.    For providers and/or their staff who would like to refer a patient to [facility], please contact us through our one-stop-shop provider referral line, [facility] , at [phone].    If you feel you have received this communication in error or would no longer like to receive these types of communications, please e-mail [email or web address]

## 2020-03-20 DIAGNOSIS — K51.90 ULCERATIVE COLITIS, CHRONIC, WITHOUT COMPLICATIONS: ICD-10-CM

## 2020-03-20 RX ORDER — BALSALAZIDE DISODIUM 750 MG/1
CAPSULE ORAL
Qty: 810 CAPSULE | Refills: 2 | Status: SHIPPED | OUTPATIENT
Start: 2020-03-20 | End: 2021-03-22 | Stop reason: SDUPTHER

## 2020-03-23 ENCOUNTER — TELEPHONE (OUTPATIENT)
Dept: GASTROENTEROLOGY | Facility: CLINIC | Age: 80
End: 2020-03-23

## 2020-03-23 RX ORDER — MESALAMINE 4 G/60ML
4 SUSPENSION RECTAL NIGHTLY
Qty: 1800 ML | Refills: 11 | Status: SHIPPED | OUTPATIENT
Start: 2020-03-23 | End: 2021-09-08 | Stop reason: SDUPTHER

## 2020-03-23 NOTE — TELEPHONE ENCOUNTER
----- Message from Justina Wynne MA sent at 3/23/2020 11:33 AM CDT -----  Contact: self  Stated she had to go to the ED on 3/5 because she was coughing up blood.  She was put on antibiotics and completed them on 3/16.  This past Saturday she started with some rectal bleeding.  She would like to know if she should start with the Mesalamine Enemas(Rowasa).  She only has 3 applications left.  Will need a Rx.  She is seeing her local MD on 4/7 for the coughing up blood.  Would like Rx sent to pharmacy listed in [employer].

## 2020-09-29 ENCOUNTER — TELEPHONE (OUTPATIENT)
Dept: GASTROENTEROLOGY | Facility: CLINIC | Age: 80
End: 2020-09-29

## 2020-09-29 NOTE — TELEPHONE ENCOUNTER
She had been doing very well with her colitis for the 1st 10 months this year.  Two weeks ago she started having increasing loose stools and urgency.  This is despite taking balsalazide.  No bleed, but similar symptoms to past flares.    I recommended that she add the Rowasa enemas.  Do this for 2 weeks and give us a call.  She is hesitant to travel down here because of COVID risk.  I think we can manage this on the phone.

## 2020-11-02 ENCOUNTER — TELEPHONE (OUTPATIENT)
Dept: ENDOSCOPY | Facility: HOSPITAL | Age: 80
End: 2020-11-02

## 2020-11-02 NOTE — TELEPHONE ENCOUNTER
----- Message from Elisa Suarez sent at 11/2/2020  1:35 PM CST -----  Contact: pt  Please call pt at [phone]     Patient is calling with update of her medical condition    Thank you

## 2020-11-02 NOTE — TELEPHONE ENCOUNTER
MD Augustina Cavanaugh MA   Caller: Unspecified (Today,  2:12 PM)             That is great news, she can taper off it and stop it      Patient informed.

## 2021-03-22 DIAGNOSIS — K51.90 ULCERATIVE COLITIS, CHRONIC, WITHOUT COMPLICATIONS: ICD-10-CM

## 2021-03-22 RX ORDER — BALSALAZIDE DISODIUM 750 MG/1
CAPSULE ORAL
Qty: 810 CAPSULE | Refills: 2 | Status: SHIPPED | OUTPATIENT
Start: 2021-03-22 | End: 2022-03-09 | Stop reason: SDUPTHER

## 2021-08-17 ENCOUNTER — TELEPHONE (OUTPATIENT)
Dept: GASTROENTEROLOGY | Facility: CLINIC | Age: 81
End: 2021-08-17

## 2021-09-07 ENCOUNTER — TELEPHONE (OUTPATIENT)
Dept: GASTROENTEROLOGY | Facility: HOSPITAL | Age: 81
End: 2021-09-07

## 2021-09-08 ENCOUNTER — PATIENT MESSAGE (OUTPATIENT)
Dept: GASTROENTEROLOGY | Facility: CLINIC | Age: 81
End: 2021-09-08

## 2021-09-08 RX ORDER — MESALAMINE 4 G/60ML
4 SUSPENSION RECTAL NIGHTLY
Qty: 1800 ML | Refills: 11 | Status: SHIPPED | OUTPATIENT
Start: 2021-09-08 | End: 2023-08-15 | Stop reason: SDUPTHER

## 2021-09-14 ENCOUNTER — TELEPHONE (OUTPATIENT)
Dept: GASTROENTEROLOGY | Facility: CLINIC | Age: 81
End: 2021-09-14

## 2021-11-04 ENCOUNTER — OFFICE VISIT (OUTPATIENT)
Dept: GASTROENTEROLOGY | Facility: CLINIC | Age: 81
End: 2021-11-04
Payer: MEDICARE

## 2021-11-04 ENCOUNTER — LAB VISIT (OUTPATIENT)
Dept: LAB | Facility: HOSPITAL | Age: 81
End: 2021-11-04
Attending: INTERNAL MEDICINE
Payer: MEDICARE

## 2021-11-04 VITALS
DIASTOLIC BLOOD PRESSURE: 75 MMHG | SYSTOLIC BLOOD PRESSURE: 175 MMHG | WEIGHT: 156.31 LBS | HEART RATE: 59 BPM | HEIGHT: 68 IN | BODY MASS INDEX: 23.69 KG/M2

## 2021-11-04 DIAGNOSIS — K51.00 ULCERATIVE CHRONIC PANCOLITIS WITHOUT COMPLICATIONS: ICD-10-CM

## 2021-11-04 DIAGNOSIS — K51.00 ULCERATIVE CHRONIC PANCOLITIS WITHOUT COMPLICATIONS: Primary | ICD-10-CM

## 2021-11-04 LAB
ALBUMIN SERPL BCP-MCNC: 4.3 G/DL (ref 3.5–5.2)
ALP SERPL-CCNC: 61 U/L (ref 55–135)
ALT SERPL W/O P-5'-P-CCNC: 17 U/L (ref 10–44)
ANION GAP SERPL CALC-SCNC: 9 MMOL/L (ref 8–16)
AST SERPL-CCNC: 35 U/L (ref 10–40)
BASOPHILS # BLD AUTO: 0.05 K/UL (ref 0–0.2)
BASOPHILS NFR BLD: 0.8 % (ref 0–1.9)
BILIRUB SERPL-MCNC: 1.2 MG/DL (ref 0.1–1)
BUN SERPL-MCNC: 16 MG/DL (ref 8–23)
CALCIUM SERPL-MCNC: 9.9 MG/DL (ref 8.7–10.5)
CHLORIDE SERPL-SCNC: 100 MMOL/L (ref 95–110)
CO2 SERPL-SCNC: 32 MMOL/L (ref 23–29)
CREAT SERPL-MCNC: 0.7 MG/DL (ref 0.5–1.4)
CRP SERPL-MCNC: 3 MG/L (ref 0–8.2)
DIFFERENTIAL METHOD: ABNORMAL
EOSINOPHIL # BLD AUTO: 0.2 K/UL (ref 0–0.5)
EOSINOPHIL NFR BLD: 2.4 % (ref 0–8)
ERYTHROCYTE [DISTWIDTH] IN BLOOD BY AUTOMATED COUNT: 13.7 % (ref 11.5–14.5)
EST. GFR  (AFRICAN AMERICAN): >60 ML/MIN/1.73 M^2
EST. GFR  (NON AFRICAN AMERICAN): >60 ML/MIN/1.73 M^2
GLUCOSE SERPL-MCNC: 105 MG/DL (ref 70–110)
HCT VFR BLD AUTO: 42.1 % (ref 37–48.5)
HGB BLD-MCNC: 13.3 G/DL (ref 12–16)
IMM GRANULOCYTES # BLD AUTO: 0.01 K/UL (ref 0–0.04)
IMM GRANULOCYTES NFR BLD AUTO: 0.2 % (ref 0–0.5)
LYMPHOCYTES # BLD AUTO: 2.2 K/UL (ref 1–4.8)
LYMPHOCYTES NFR BLD: 35.7 % (ref 18–48)
MCH RBC QN AUTO: 29.1 PG (ref 27–31)
MCHC RBC AUTO-ENTMCNC: 31.6 G/DL (ref 32–36)
MCV RBC AUTO: 92 FL (ref 82–98)
MONOCYTES # BLD AUTO: 0.5 K/UL (ref 0.3–1)
MONOCYTES NFR BLD: 8.2 % (ref 4–15)
NEUTROPHILS # BLD AUTO: 3.3 K/UL (ref 1.8–7.7)
NEUTROPHILS NFR BLD: 52.7 % (ref 38–73)
NRBC BLD-RTO: 0 /100 WBC
PLATELET # BLD AUTO: 246 K/UL (ref 150–450)
PMV BLD AUTO: 10.2 FL (ref 9.2–12.9)
POTASSIUM SERPL-SCNC: 4.6 MMOL/L (ref 3.5–5.1)
PROT SERPL-MCNC: 7.5 G/DL (ref 6–8.4)
RBC # BLD AUTO: 4.57 M/UL (ref 4–5.4)
SODIUM SERPL-SCNC: 141 MMOL/L (ref 136–145)
WBC # BLD AUTO: 6.22 K/UL (ref 3.9–12.7)

## 2021-11-04 PROCEDURE — 99999 PR PBB SHADOW E&M-EST. PATIENT-LVL IV: CPT | Mod: PBBFAC,,, | Performed by: INTERNAL MEDICINE

## 2021-11-04 PROCEDURE — 3078F PR MOST RECENT DIASTOLIC BLOOD PRESSURE < 80 MM HG: ICD-10-PCS | Mod: CPTII,S$GLB,, | Performed by: INTERNAL MEDICINE

## 2021-11-04 PROCEDURE — 1126F AMNT PAIN NOTED NONE PRSNT: CPT | Mod: CPTII,S$GLB,, | Performed by: INTERNAL MEDICINE

## 2021-11-04 PROCEDURE — 85025 COMPLETE CBC W/AUTO DIFF WBC: CPT | Performed by: INTERNAL MEDICINE

## 2021-11-04 PROCEDURE — 3078F DIAST BP <80 MM HG: CPT | Mod: CPTII,S$GLB,, | Performed by: INTERNAL MEDICINE

## 2021-11-04 PROCEDURE — 3077F PR MOST RECENT SYSTOLIC BLOOD PRESSURE >= 140 MM HG: ICD-10-PCS | Mod: CPTII,S$GLB,, | Performed by: INTERNAL MEDICINE

## 2021-11-04 PROCEDURE — 1160F PR REVIEW ALL MEDS BY PRESCRIBER/CLIN PHARMACIST DOCUMENTED: ICD-10-PCS | Mod: CPTII,S$GLB,, | Performed by: INTERNAL MEDICINE

## 2021-11-04 PROCEDURE — 3288F PR FALLS RISK ASSESSMENT DOCUMENTED: ICD-10-PCS | Mod: CPTII,S$GLB,, | Performed by: INTERNAL MEDICINE

## 2021-11-04 PROCEDURE — 1126F PR PAIN SEVERITY QUANTIFIED, NO PAIN PRESENT: ICD-10-PCS | Mod: CPTII,S$GLB,, | Performed by: INTERNAL MEDICINE

## 2021-11-04 PROCEDURE — 3077F SYST BP >= 140 MM HG: CPT | Mod: CPTII,S$GLB,, | Performed by: INTERNAL MEDICINE

## 2021-11-04 PROCEDURE — 36415 COLL VENOUS BLD VENIPUNCTURE: CPT | Performed by: INTERNAL MEDICINE

## 2021-11-04 PROCEDURE — 1159F MED LIST DOCD IN RCRD: CPT | Mod: CPTII,S$GLB,, | Performed by: INTERNAL MEDICINE

## 2021-11-04 PROCEDURE — 99999 PR PBB SHADOW E&M-EST. PATIENT-LVL IV: ICD-10-PCS | Mod: PBBFAC,,, | Performed by: INTERNAL MEDICINE

## 2021-11-04 PROCEDURE — 86140 C-REACTIVE PROTEIN: CPT | Performed by: INTERNAL MEDICINE

## 2021-11-04 PROCEDURE — 99214 OFFICE O/P EST MOD 30 MIN: CPT | Mod: S$GLB,,, | Performed by: INTERNAL MEDICINE

## 2021-11-04 PROCEDURE — 99214 PR OFFICE/OUTPT VISIT, EST, LEVL IV, 30-39 MIN: ICD-10-PCS | Mod: S$GLB,,, | Performed by: INTERNAL MEDICINE

## 2021-11-04 PROCEDURE — 3288F FALL RISK ASSESSMENT DOCD: CPT | Mod: CPTII,S$GLB,, | Performed by: INTERNAL MEDICINE

## 2021-11-04 PROCEDURE — 1101F PR PT FALLS ASSESS DOC 0-1 FALLS W/OUT INJ PAST YR: ICD-10-PCS | Mod: CPTII,S$GLB,, | Performed by: INTERNAL MEDICINE

## 2021-11-04 PROCEDURE — 80053 COMPREHEN METABOLIC PANEL: CPT | Performed by: INTERNAL MEDICINE

## 2021-11-04 PROCEDURE — 1160F RVW MEDS BY RX/DR IN RCRD: CPT | Mod: CPTII,S$GLB,, | Performed by: INTERNAL MEDICINE

## 2021-11-04 PROCEDURE — 1159F PR MEDICATION LIST DOCUMENTED IN MEDICAL RECORD: ICD-10-PCS | Mod: CPTII,S$GLB,, | Performed by: INTERNAL MEDICINE

## 2021-11-04 PROCEDURE — 1101F PT FALLS ASSESS-DOCD LE1/YR: CPT | Mod: CPTII,S$GLB,, | Performed by: INTERNAL MEDICINE

## 2021-11-04 RX ORDER — MESALAMINE 1000 MG/1
1000 SUPPOSITORY RECTAL NIGHTLY
Qty: 30 SUPPOSITORY | Refills: 5 | Status: SHIPPED | OUTPATIENT
Start: 2021-11-04 | End: 2021-12-04

## 2021-11-06 ENCOUNTER — PATIENT MESSAGE (OUTPATIENT)
Dept: GASTROENTEROLOGY | Facility: CLINIC | Age: 81
End: 2021-11-06
Payer: MEDICARE

## 2021-11-09 ENCOUNTER — TELEPHONE (OUTPATIENT)
Dept: GASTROENTEROLOGY | Facility: CLINIC | Age: 81
End: 2021-11-09
Payer: MEDICARE

## 2021-11-16 ENCOUNTER — TELEPHONE (OUTPATIENT)
Dept: GASTROENTEROLOGY | Facility: CLINIC | Age: 81
End: 2021-11-16
Payer: MEDICARE

## 2021-11-22 ENCOUNTER — TELEPHONE (OUTPATIENT)
Dept: GASTROENTEROLOGY | Facility: CLINIC | Age: 81
End: 2021-11-22
Payer: MEDICARE

## 2022-03-08 ENCOUNTER — TELEPHONE (OUTPATIENT)
Dept: GASTROENTEROLOGY | Facility: CLINIC | Age: 82
End: 2022-03-08
Payer: MEDICARE

## 2022-03-08 ENCOUNTER — DOCUMENTATION ONLY (OUTPATIENT)
Dept: GASTROENTEROLOGY | Facility: HOSPITAL | Age: 82
End: 2022-03-08
Payer: MEDICARE

## 2022-03-08 NOTE — TELEPHONE ENCOUNTER
Message left for patient to return my call regarding outside stool study results.   Dr.James Rocha said to tell her stool test was within normal limits.  He also would like to know how she is feeling.   Sosa

## 2022-03-09 DIAGNOSIS — K51.90 ULCERATIVE COLITIS, CHRONIC, WITHOUT COMPLICATIONS: ICD-10-CM

## 2022-03-09 RX ORDER — BALSALAZIDE DISODIUM 750 MG/1
CAPSULE ORAL
Qty: 810 CAPSULE | Refills: 2 | Status: SHIPPED | OUTPATIENT
Start: 2022-03-09 | End: 2022-12-20 | Stop reason: SDUPTHER

## 2022-08-16 ENCOUNTER — TELEPHONE (OUTPATIENT)
Dept: GASTROENTEROLOGY | Facility: CLINIC | Age: 82
End: 2022-08-16
Payer: MEDICARE

## 2022-08-16 NOTE — TELEPHONE ENCOUNTER
----- Message from Hong Rocha MD sent at 8/16/2022 11:22 AM CDT -----  Contact: pt  Ok, diflucan should help with any yeast infection  ----- Message -----  From: Justina Wynne MA  Sent: 8/16/2022  11:19 AM CDT  To: Hong Rocha MD    Called patient for an update on how she was feeling.   Sunday she ran a fever and slept all day.  Monday she saw her local MD and was put on 3 days of Diflucan for the yeast infection.  She also stated he sent a sample to the lab.   Today she is feeling somewhat better.   Sosa  ----- Message -----  From: Francisca Moreland  Sent: 8/16/2022  11:10 AM CDT  To: Aj LAMAS Staff    Pt requesting call back from office ,       Confirmed patient's contact info below:  Contact Name: Lena Felipe  Phone Number: [phone]

## 2022-08-19 ENCOUNTER — TELEPHONE (OUTPATIENT)
Dept: ENDOSCOPY | Facility: HOSPITAL | Age: 82
End: 2022-08-19
Payer: MEDICARE

## 2022-08-19 NOTE — TELEPHONE ENCOUNTER
----- Message from Tanya Kendrick sent at 8/19/2022  9:54 AM CDT -----  Contact: pt  Pt requesting callback to speak with the nurse           Confirmed contact below:  Contact Name:Lena Felipe  Phone Number: [phone]

## 2022-08-19 NOTE — TELEPHONE ENCOUNTER
I spoke with patient. She says she thinks she has diverticulitis.and wanted to know if you could prescribe something.  When she heard you were off today, she said please wait until Monday before responding,.

## 2022-08-22 ENCOUNTER — PATIENT MESSAGE (OUTPATIENT)
Dept: GASTROENTEROLOGY | Facility: CLINIC | Age: 82
End: 2022-08-22
Payer: MEDICARE

## 2022-08-22 NOTE — TELEPHONE ENCOUNTER
MD Augustina Cavanaugh MA  Caller: Unspecified (3 days ago,  3:42 PM)  I called patient today.  She is feeling better.  Less soreness, less abdominal pain.  No fever.  I think at this time with her feeling better I do not want to recommend antibiotics.  I reviewed potential complications of antibiotics.  We discussed diet.  She will stay in touch

## 2022-11-29 ENCOUNTER — DOCUMENTATION ONLY (OUTPATIENT)
Dept: GASTROENTEROLOGY | Facility: HOSPITAL | Age: 82
End: 2022-11-29
Payer: MEDICARE

## 2022-11-29 NOTE — PROGRESS NOTES
Patient doing well.  Difficult to get down here from [place] right now  Has had recent labs done by primary care doctor   On balsalazide 9 tabs a day  Colitis is doing the best it is ever done.  Normal formed BM every day   No urgency   No blood or mucus    No need for return visit here at least now  I will refill her meds

## 2022-12-20 DIAGNOSIS — K51.90 ULCERATIVE COLITIS, CHRONIC, WITHOUT COMPLICATIONS: ICD-10-CM

## 2022-12-20 RX ORDER — BALSALAZIDE DISODIUM 750 MG/1
CAPSULE ORAL
Qty: 810 CAPSULE | Refills: 2 | Status: SHIPPED | OUTPATIENT
Start: 2022-12-20 | End: 2023-12-14 | Stop reason: SDUPTHER

## 2023-08-15 ENCOUNTER — TELEPHONE (OUTPATIENT)
Dept: GASTROENTEROLOGY | Facility: CLINIC | Age: 83
End: 2023-08-15
Payer: MEDICARE

## 2023-08-15 RX ORDER — MESALAMINE 4 G/60ML
4 SUSPENSION RECTAL NIGHTLY
Qty: 1800 ML | Refills: 11 | Status: SHIPPED | OUTPATIENT
Start: 2023-08-15 | End: 2024-08-14

## 2023-08-15 NOTE — TELEPHONE ENCOUNTER
----- Message from Hong Rocha MD sent at 8/15/2023  9:59 AM CDT -----  Regarding: RE: call back  Contact: [phone]  No problem, I just called in the rowasa enemas for nightly use  ----- Message -----  From: Justina Wynne MA  Sent: 8/14/2023   4:02 PM CDT  To: Hong Rocha MD  Subject: FW: call back                                    Spoke with patient.  Stated she started bleeding a couple of weeks ago.  She is presently taking Balsalazide 3, 3 times a day.  Feels she needs a Rx for mesalamine.   Stated it is too difficult to come to [place] for an appointment since her  had brain surgery.   Sosa  ----- Message -----  From: Shakira Neville  Sent: 8/14/2023   3:50 PM CDT  To: Aj LAMAS Staff  Subject: call back                                        Pt calling in requesting call back from Carmen please call to discuss Further

## 2023-12-13 ENCOUNTER — TELEPHONE (OUTPATIENT)
Dept: GASTROENTEROLOGY | Facility: CLINIC | Age: 83
End: 2023-12-13
Payer: MEDICARE

## 2023-12-13 NOTE — TELEPHONE ENCOUNTER
----- Message from Laurie Armstrong sent at 12/13/2023  2:08 PM CST -----  Regarding: Breezy  Contact: Pt @ [phone]  Message is for Carmen to call pt back to discuss maintenance over the holiday with her health. Asking for a call back

## 2023-12-14 ENCOUNTER — TELEPHONE (OUTPATIENT)
Dept: GASTROENTEROLOGY | Facility: CLINIC | Age: 83
End: 2023-12-14
Payer: MEDICARE

## 2023-12-14 DIAGNOSIS — K51.90 ULCERATIVE COLITIS, CHRONIC, WITHOUT COMPLICATIONS: ICD-10-CM

## 2023-12-14 RX ORDER — BALSALAZIDE DISODIUM 750 MG/1
CAPSULE ORAL
Qty: 810 CAPSULE | Refills: 2 | Status: SHIPPED | OUTPATIENT
Start: 2023-12-14 | End: 2024-02-07 | Stop reason: SDUPTHER

## 2023-12-14 NOTE — TELEPHONE ENCOUNTER
----- Message from Hong Rocha MD sent at 12/14/2023 12:43 PM CST -----  Regarding: RE: call back  Contact: [phone]  No problem, three-month supply sent in  ----- Message -----  From: Justina Wynne MA  Sent: 12/14/2023  12:42 PM CST  To: Hong Rocha MD  Subject: FW: call back                                    Spoke with patient.   She is needing a refill on her Balsalazide 750mg, 3 caps by mouth 3 times a day.  She would like a 3 mos supply.   She knows she has not seen you in awhile.  Stated with her  being sick it is hard to come to [place].   If you need to see her she can make arrangements for the end of January.  Sosa  ----- Message -----  From: Shakira Neville  Sent: 12/14/2023   8:48 AM CST  To: Justina Wynne MA  Subject: call back                                        Pt calling in returning phone call please call to discuss further

## 2024-02-07 ENCOUNTER — TELEPHONE (OUTPATIENT)
Dept: GASTROENTEROLOGY | Facility: CLINIC | Age: 84
End: 2024-02-07
Payer: MEDICARE

## 2024-02-07 DIAGNOSIS — K51.90 ULCERATIVE COLITIS, CHRONIC, WITHOUT COMPLICATIONS: ICD-10-CM

## 2024-02-07 RX ORDER — BALSALAZIDE DISODIUM 750 MG/1
CAPSULE ORAL
Qty: 810 CAPSULE | Refills: 2 | Status: SHIPPED | OUTPATIENT
Start: 2024-02-07

## 2024-02-07 NOTE — TELEPHONE ENCOUNTER
----- Message from Hong Rocha MD sent at 2/7/2024  7:59 AM CST -----  Regarding: RE: Medication  Contact: pt @ [phone]  I am happy to refill   She was having problems getting here as I recall and we can't do virtual because in [place].  ----- Message -----  From: Justina Wynne MA  Sent: 2/6/2024   2:43 PM CST  To: Hong Rocha MD  Subject: FW: Medication                                     ----- Message -----  From: Augustina Donovan MA  Sent: 2/6/2024   2:40 PM CST  To: Justina Wynne MA  Subject: FW: Medication                                     ----- Message -----  From: Laurie Armstrong  Sent: 2/6/2024   2:39 PM CST  To: Aj Steinberg  Subject: Medication                                       Message is for Sosa, pt is calling about balsalazide (COLAZAL) 750 mg capsule. Pt is calling to see if she can get a refill or do she need to make appt. Asking for a call

## 2024-02-12 ENCOUNTER — TELEPHONE (OUTPATIENT)
Dept: GASTROENTEROLOGY | Facility: CLINIC | Age: 84
End: 2024-02-12
Payer: MEDICARE

## 2024-02-12 NOTE — TELEPHONE ENCOUNTER
Spoke with patient.   Aware her Rx has been sent to her pharmacy.   She is doing well and will call back if needed.   Sosa

## 2024-02-12 NOTE — TELEPHONE ENCOUNTER
----- Message from Kleber Zhang sent at 2/12/2024  1:36 PM CST -----  Regarding: Consult/Advise  Contact: @ [phone]  Pt is calling in wanting to speak with Sosa because she did not hear from her when she called last week. Please call pt to discuss further